# Patient Record
Sex: MALE | Race: OTHER | Employment: UNEMPLOYED | URBAN - METROPOLITAN AREA
[De-identification: names, ages, dates, MRNs, and addresses within clinical notes are randomized per-mention and may not be internally consistent; named-entity substitution may affect disease eponyms.]

---

## 2018-09-13 ENCOUNTER — HOSPITAL ENCOUNTER (OUTPATIENT)
Age: 28
Discharge: HOME | End: 2018-09-13
Payer: COMMERCIAL

## 2018-09-13 LAB
ALBUMIN SERPL-MCNC: 4.4 G/DL (ref 3.5–5.2)
ALBUMIN/GLOBULIN RATIO: ABNORMAL (ref 1–2.5)
ALP BLD-CCNC: 60 U/L (ref 40–129)
ALT SERPL-CCNC: 22 U/L (ref 5–41)
ANION GAP SERPL CALCULATED.3IONS-SCNC: 13 MMOL/L (ref 9–17)
AST SERPL-CCNC: 18 U/L
BILIRUB SERPL-MCNC: 0.21 MG/DL (ref 0.3–1.2)
BILIRUBIN URINE: NEGATIVE
BUN BLDV-MCNC: 9 MG/DL (ref 6–20)
BUN/CREAT BLD: 13 (ref 9–20)
CALCIUM SERPL-MCNC: 9.2 MG/DL (ref 8.6–10.4)
CHLORIDE BLD-SCNC: 106 MMOL/L (ref 98–107)
CHOLESTEROL, FASTING: 139 MG/DL
CHOLESTEROL/HDL RATIO: 6
CO2: 21 MMOL/L (ref 20–31)
COLOR: YELLOW
COMMENT UA: NORMAL
CREAT SERPL-MCNC: 0.67 MG/DL (ref 0.7–1.2)
ESTIMATED AVERAGE GLUCOSE: 117 MG/DL
GFR AFRICAN AMERICAN: >60 ML/MIN
GFR NON-AFRICAN AMERICAN: >60 ML/MIN
GFR SERPL CREATININE-BSD FRML MDRD: ABNORMAL ML/MIN/{1.73_M2}
GFR SERPL CREATININE-BSD FRML MDRD: ABNORMAL ML/MIN/{1.73_M2}
GLUCOSE FASTING: 105 MG/DL (ref 70–99)
GLUCOSE URINE: NEGATIVE
HBA1C MFR BLD: 5.7 % (ref 4–6)
HCT VFR BLD CALC: 43.1 % (ref 41–53)
HDLC SERPL-MCNC: 23 MG/DL
HEMOGLOBIN: 13.9 G/DL (ref 13.5–17.5)
KETONES, URINE: NEGATIVE
LDL CHOLESTEROL: 84 MG/DL (ref 0–130)
LEUKOCYTE ESTERASE, URINE: NEGATIVE
MCH RBC QN AUTO: 26.5 PG (ref 26–34)
MCHC RBC AUTO-ENTMCNC: 32.3 G/DL (ref 31–37)
MCV RBC AUTO: 81.9 FL (ref 80–100)
NITRITE, URINE: NEGATIVE
NRBC AUTOMATED: ABNORMAL PER 100 WBC
PDW BLD-RTO: 15.2 % (ref 11.5–14.5)
PH UA: 7 (ref 5–8)
PLATELET # BLD: 211 K/UL (ref 130–400)
PMV BLD AUTO: 8.7 FL (ref 6–12)
POTASSIUM SERPL-SCNC: 4.1 MMOL/L (ref 3.7–5.3)
PROTEIN UA: NEGATIVE
RBC # BLD: 5.25 M/UL (ref 4.5–5.9)
SODIUM BLD-SCNC: 140 MMOL/L (ref 135–144)
SPECIFIC GRAVITY UA: 1.01 (ref 1–1.03)
TOTAL PROTEIN: 7.8 G/DL (ref 6.4–8.3)
TRIGLYCERIDE, FASTING: 160 MG/DL
TURBIDITY: CLEAR
URINE HGB: NEGATIVE
UROBILINOGEN, URINE: NORMAL
VLDLC SERPL CALC-MCNC: ABNORMAL MG/DL (ref 1–30)
WBC # BLD: 5.7 K/UL (ref 3.5–11)

## 2018-09-13 PROCEDURE — 81003 URINALYSIS AUTO W/O SCOPE: CPT

## 2018-09-13 PROCEDURE — 83036 HEMOGLOBIN GLYCOSYLATED A1C: CPT

## 2018-09-13 PROCEDURE — 36415 COLL VENOUS BLD VENIPUNCTURE: CPT

## 2018-09-13 PROCEDURE — 80061 LIPID PANEL: CPT

## 2018-09-13 PROCEDURE — 85027 COMPLETE CBC AUTOMATED: CPT

## 2018-09-13 PROCEDURE — 80053 COMPREHEN METABOLIC PANEL: CPT

## 2025-02-05 ENCOUNTER — TRANSCRIBE ORDERS (OUTPATIENT)
Dept: ADMINISTRATIVE | Age: 35
End: 2025-02-05

## 2025-02-05 DIAGNOSIS — R94.6 ABNORMAL THYROID SCAN: Primary | ICD-10-CM

## 2025-06-02 ENCOUNTER — HOSPITAL ENCOUNTER (INPATIENT)
Age: 35
LOS: 4 days | Discharge: HOME | End: 2025-06-06
Attending: EMERGENCY MEDICINE | Admitting: INTERNAL MEDICINE
Payer: MEDICARE

## 2025-06-02 DIAGNOSIS — R45.850 HOMICIDAL IDEATIONS: ICD-10-CM

## 2025-06-02 DIAGNOSIS — F32.A DEPRESSION WITH SUICIDAL IDEATION: Primary | ICD-10-CM

## 2025-06-02 DIAGNOSIS — R45.851 DEPRESSION WITH SUICIDAL IDEATION: Primary | ICD-10-CM

## 2025-06-02 PROBLEM — F23 ACUTE PSYCHOSIS (HCC): Status: ACTIVE | Noted: 2025-06-02

## 2025-06-02 LAB
ALBUMIN SERPL-MCNC: 4.4 G/DL (ref 3.5–5.2)
ALP SERPL-CCNC: 60 U/L (ref 40–129)
ALT SERPL-CCNC: 35 U/L (ref 10–50)
AMPHET UR QL SCN: POSITIVE
ANION GAP SERPL CALCULATED.3IONS-SCNC: 13 MMOL/L (ref 9–16)
AST SERPL-CCNC: 44 U/L (ref 10–50)
BARBITURATES UR QL SCN: NEGATIVE
BASOPHILS # BLD: 0.03 K/UL (ref 0–0.2)
BASOPHILS NFR BLD: 0 % (ref 0–2)
BENZODIAZ UR QL: NEGATIVE
BILIRUB SERPL-MCNC: 0.3 MG/DL (ref 0–1.2)
BUN SERPL-MCNC: 8 MG/DL (ref 6–20)
CALCIUM SERPL-MCNC: 9.5 MG/DL (ref 8.6–10.4)
CANNABINOIDS UR QL SCN: NEGATIVE
CHLORIDE SERPL-SCNC: 111 MMOL/L (ref 98–107)
CO2 SERPL-SCNC: 17 MMOL/L (ref 20–31)
COCAINE UR QL SCN: NEGATIVE
CREAT SERPL-MCNC: 0.7 MG/DL (ref 0.7–1.2)
DATE LAST DOSE: NORMAL
EOSINOPHIL # BLD: 0.04 K/UL (ref 0–0.44)
EOSINOPHILS RELATIVE PERCENT: 1 % (ref 0–4)
ERYTHROCYTE [DISTWIDTH] IN BLOOD BY AUTOMATED COUNT: 14.8 % (ref 11.5–14.9)
ETHANOL PERCENT: NORMAL %
ETHANOLAMINE SERPL-MCNC: <10 MG/DL (ref 0–0.08)
FENTANYL UR QL: NEGATIVE
GFR, ESTIMATED: >90 ML/MIN/1.73M2
GLUCOSE SERPL-MCNC: 101 MG/DL (ref 74–99)
HCT VFR BLD AUTO: 39.1 % (ref 41–53)
HGB BLD-MCNC: 12.9 G/DL (ref 13.5–17.5)
IMM GRANULOCYTES # BLD AUTO: 0.04 K/UL (ref 0–0.3)
IMM GRANULOCYTES NFR BLD: 1 %
LYMPHOCYTES NFR BLD: 3.01 K/UL (ref 1.1–3.7)
LYMPHOCYTES RELATIVE PERCENT: 43 % (ref 24–44)
MAGNESIUM SERPL-MCNC: 2.1 MG/DL (ref 1.6–2.6)
MCH RBC QN AUTO: 27.3 PG (ref 26–34)
MCHC RBC AUTO-ENTMCNC: 33 G/DL (ref 31–37)
MCV RBC AUTO: 82.7 FL (ref 80–100)
METHADONE UR QL: NEGATIVE
MONOCYTES NFR BLD: 0.56 K/UL (ref 0.1–1.2)
MONOCYTES NFR BLD: 8 % (ref 3–12)
NEUTROPHILS NFR BLD: 47 % (ref 36–66)
NEUTS SEG NFR BLD: 3.3 K/UL (ref 1.5–8.1)
NRBC BLD-RTO: 0 PER 100 WBC
OPIATES UR QL SCN: NEGATIVE
OXYCODONE UR QL SCN: NEGATIVE
PCP UR QL SCN: NEGATIVE
PLATELET # BLD AUTO: 236 K/UL (ref 150–450)
PMV BLD AUTO: 10.4 FL (ref 8–13.5)
POTASSIUM SERPL-SCNC: 4 MMOL/L (ref 3.7–5.3)
PROT SERPL-MCNC: 8 G/DL (ref 6.6–8.7)
RBC # BLD AUTO: 4.73 M/UL (ref 4.21–5.77)
SODIUM SERPL-SCNC: 141 MMOL/L (ref 136–145)
TEST INFORMATION: ABNORMAL
TME LAST DOSE: NORMAL H
VALPROATE SERPL-MCNC: 88 UG/ML (ref 50–125)
VANCOMYCIN DOSE: NORMAL MG
WBC OTHER # BLD: 7 K/UL (ref 3.5–11)

## 2025-06-02 PROCEDURE — 1240000000 HC EMOTIONAL WELLNESS R&B

## 2025-06-02 PROCEDURE — 83735 ASSAY OF MAGNESIUM: CPT

## 2025-06-02 PROCEDURE — 99285 EMERGENCY DEPT VISIT HI MDM: CPT

## 2025-06-02 PROCEDURE — 36415 COLL VENOUS BLD VENIPUNCTURE: CPT

## 2025-06-02 PROCEDURE — 85025 COMPLETE CBC W/AUTO DIFF WBC: CPT

## 2025-06-02 PROCEDURE — 80307 DRUG TEST PRSMV CHEM ANLYZR: CPT

## 2025-06-02 PROCEDURE — 80164 ASSAY DIPROPYLACETIC ACD TOT: CPT

## 2025-06-02 PROCEDURE — G0480 DRUG TEST DEF 1-7 CLASSES: HCPCS

## 2025-06-02 PROCEDURE — 6370000000 HC RX 637 (ALT 250 FOR IP): Performed by: PSYCHIATRY & NEUROLOGY

## 2025-06-02 PROCEDURE — 80053 COMPREHEN METABOLIC PANEL: CPT

## 2025-06-02 RX ORDER — IBUPROFEN 400 MG/1
400 TABLET, FILM COATED ORAL EVERY 6 HOURS PRN
Status: DISCONTINUED | OUTPATIENT
Start: 2025-06-02 | End: 2025-06-06 | Stop reason: HOSPADM

## 2025-06-02 RX ORDER — DIPHENHYDRAMINE HYDROCHLORIDE 50 MG/ML
50 INJECTION, SOLUTION INTRAMUSCULAR; INTRAVENOUS EVERY 6 HOURS PRN
Status: DISCONTINUED | OUTPATIENT
Start: 2025-06-02 | End: 2025-06-06 | Stop reason: HOSPADM

## 2025-06-02 RX ORDER — MAGNESIUM HYDROXIDE/ALUMINUM HYDROXICE/SIMETHICONE 120; 1200; 1200 MG/30ML; MG/30ML; MG/30ML
30 SUSPENSION ORAL EVERY 6 HOURS PRN
Status: DISCONTINUED | OUTPATIENT
Start: 2025-06-02 | End: 2025-06-06 | Stop reason: HOSPADM

## 2025-06-02 RX ORDER — POLYETHYLENE GLYCOL 3350 17 G/17G
17 POWDER, FOR SOLUTION ORAL DAILY PRN
Status: DISCONTINUED | OUTPATIENT
Start: 2025-06-02 | End: 2025-06-06 | Stop reason: HOSPADM

## 2025-06-02 RX ORDER — LORAZEPAM 1 MG/1
2 TABLET ORAL EVERY 6 HOURS PRN
Status: DISCONTINUED | OUTPATIENT
Start: 2025-06-02 | End: 2025-06-06 | Stop reason: HOSPADM

## 2025-06-02 RX ORDER — POLYETHYLENE GLYCOL 3350 17 G
2 POWDER IN PACKET (EA) ORAL
Status: DISCONTINUED | OUTPATIENT
Start: 2025-06-02 | End: 2025-06-06 | Stop reason: HOSPADM

## 2025-06-02 RX ORDER — LORAZEPAM 2 MG/ML
2 INJECTION INTRAMUSCULAR EVERY 6 HOURS PRN
Status: DISCONTINUED | OUTPATIENT
Start: 2025-06-02 | End: 2025-06-06 | Stop reason: HOSPADM

## 2025-06-02 RX ORDER — TRAZODONE HYDROCHLORIDE 50 MG/1
50 TABLET ORAL NIGHTLY PRN
Status: DISCONTINUED | OUTPATIENT
Start: 2025-06-02 | End: 2025-06-06 | Stop reason: HOSPADM

## 2025-06-02 RX ORDER — ACETAMINOPHEN 325 MG/1
650 TABLET ORAL EVERY 6 HOURS PRN
Status: DISCONTINUED | OUTPATIENT
Start: 2025-06-02 | End: 2025-06-06 | Stop reason: HOSPADM

## 2025-06-02 RX ORDER — HALOPERIDOL 5 MG/ML
5 INJECTION INTRAMUSCULAR EVERY 6 HOURS PRN
Status: DISCONTINUED | OUTPATIENT
Start: 2025-06-02 | End: 2025-06-06 | Stop reason: HOSPADM

## 2025-06-02 RX ORDER — HALOPERIDOL 5 MG/1
5 TABLET ORAL EVERY 6 HOURS PRN
Status: DISCONTINUED | OUTPATIENT
Start: 2025-06-02 | End: 2025-06-06 | Stop reason: HOSPADM

## 2025-06-02 RX ORDER — HYDROXYZINE HYDROCHLORIDE 50 MG/1
50 TABLET, FILM COATED ORAL 3 TIMES DAILY PRN
Status: DISCONTINUED | OUTPATIENT
Start: 2025-06-02 | End: 2025-06-06 | Stop reason: HOSPADM

## 2025-06-02 RX ADMIN — HYDROXYZINE HYDROCHLORIDE 50 MG: 50 TABLET, FILM COATED ORAL at 21:27

## 2025-06-02 RX ADMIN — TRAZODONE HYDROCHLORIDE 50 MG: 50 TABLET ORAL at 21:27

## 2025-06-02 ASSESSMENT — SLEEP AND FATIGUE QUESTIONNAIRES
DO YOU HAVE DIFFICULTY SLEEPING: NO
DO YOU USE A SLEEP AID: NO
AVERAGE NUMBER OF SLEEP HOURS: 6

## 2025-06-02 ASSESSMENT — LIFESTYLE VARIABLES
HOW OFTEN DO YOU HAVE A DRINK CONTAINING ALCOHOL: NEVER
HOW OFTEN DO YOU HAVE A DRINK CONTAINING ALCOHOL: NEVER
HOW MANY STANDARD DRINKS CONTAINING ALCOHOL DO YOU HAVE ON A TYPICAL DAY: PATIENT DOES NOT DRINK
HOW MANY STANDARD DRINKS CONTAINING ALCOHOL DO YOU HAVE ON A TYPICAL DAY: PATIENT DOES NOT DRINK

## 2025-06-02 ASSESSMENT — PATIENT HEALTH QUESTIONNAIRE - PHQ9
SUM OF ALL RESPONSES TO PHQ QUESTIONS 1-9: 2
2. FEELING DOWN, DEPRESSED OR HOPELESS: SEVERAL DAYS
1. LITTLE INTEREST OR PLEASURE IN DOING THINGS: SEVERAL DAYS
SUM OF ALL RESPONSES TO PHQ QUESTIONS 1-9: 2

## 2025-06-02 NOTE — ED NOTES
Provisional Diagnosis:   Acute psychosis     Psychosocial and Contextual Factors:   Patient  presents at the ED via his care worker due to suicidal ideations.     C-SSRS Summary:  X    Patient: X  Family:  X  Agency: X    Substance Abuse: Patient denies    Present Suicidal Behavior:  Patient denying current thoughts    Verbal:      Attempt:     Past Suicidal Behavior: X    Verbal:X    Attempt:       Self-Injurious/Self-Mutilation:        Violence Current or Past        Trauma Identified:       Protective Factors:    Hosing, linked with mental health agency, good support      Risk Factors:           Clinical Summary:    Elías Cha is a 34 y.o. male who presents at the ED via his care worker due to suicidal ideations.     Per care worker, patient has been having an psychotic episode for 10+ days. Patient has had increase aggression, suicidal and homicidal ideations. Care worker reports that patient attempted to run out into traffic and jump out of moving car in order to end his life. Patient confirms that he did try these things in order to end his life.     Patient reports that his girlfriend has been cheating on him and it is causing him to be stressed. Patient stated that when his girlfriend says/does these things it causes him to feel suicidal. Patient reports his girlfriend also threatens suicide. Patient has also admitted to punching walls out of anger and to hurt himself.     Patient reports being linked with [facility]. Patient reports he takes his medication as prescribed.     Patient is his own guardian. Patient has a developmental disability.     Patient lives in group home with 24/7 staff.     Level of Care Disposition:    Writer consulted with Dr. Carey, psychiatrist who recommends inpatient psychiatric admission for safety and stabilization. Patient admitted via pink slip.

## 2025-06-02 NOTE — ED NOTES
Patient was brought to the ED for suicidal ideations. Per patient's Dr. Bill \"the patient jumped out of a moving car, hit the wall trying to break his hands. The patient has been acting this way for 10 plus days because his girlfriend is cheating on him. That the patient is trying to hurt himself\". The patient is linked with [facility] and taking his medications. Patient denies any VH, AH. Patient is having HI to his ex-girlfriend.

## 2025-06-02 NOTE — ED PROVIDER NOTES
EMERGENCY DEPARTMENT ENCOUNTER    Pt Name: Elías Cha  MRN: [id number]  Birthdate 1990  Date of evaluation: 6/2/25  CHIEF COMPLAINT       Chief Complaint   Patient presents with    Suicidal     HISTORY OF PRESENT ILLNESS   HPI  Patient presents with suicidal ideations.  He was walking into traffic.  He was having homicidal ideations towards his girlfriend that he has been having relationship issues with.  She lives right down the street.  They both live in group homes.  He is compliant with medications.  He has a history of seizures.  He lives in a group home.  [facility]  His psychiatrist is Dr. Mcintosh.  Phone number [phone].  I spoke with the patient and his mother with his permission for history.  Patient is reluctant to stay but he agrees to stay in the hospital.  He has been having seizures but he compliant with his medications.  His seizure doctor is at [facility].      REVIEW OF SYSTEMS     Review of Systems   All other systems reviewed and are negative.    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    SURGICAL HISTORY       Past Surgical History:   Procedure Laterality Date    DENTAL SURGERY  01/18/2017    extraction of tooth number 31    HAND SURGERY      Rt finger severed and placed back     HERNIA REPAIR Right OCT 1994    inguinal    UNDESCENDED TESTICLE EXPLORATION  1994    WISDOM TOOTH EXTRACTION  01/18/2017    x4     CURRENT MEDICATIONS

## 2025-06-03 PROBLEM — F33.2 MAJOR DEPRESSIVE DISORDER, RECURRENT SEVERE WITHOUT PSYCHOTIC FEATURES (HCC): Status: ACTIVE | Noted: 2025-06-03

## 2025-06-03 PROBLEM — G40.909 SEIZURE DISORDER (HCC): Status: ACTIVE | Noted: 2025-06-03

## 2025-06-03 PROBLEM — R45.851 DEPRESSION WITH SUICIDAL IDEATION: Status: ACTIVE | Noted: 2025-06-03

## 2025-06-03 PROBLEM — F23 ACUTE PSYCHOSIS (HCC): Status: RESOLVED | Noted: 2025-06-02 | Resolved: 2025-06-03

## 2025-06-03 PROBLEM — F32.A DEPRESSION WITH SUICIDAL IDEATION: Status: ACTIVE | Noted: 2025-06-03

## 2025-06-03 PROBLEM — F84.0 AUTISM SPECTRUM DISORDER: Status: ACTIVE | Noted: 2025-06-03

## 2025-06-03 PROCEDURE — 6370000000 HC RX 637 (ALT 250 FOR IP): Performed by: PSYCHIATRY & NEUROLOGY

## 2025-06-03 PROCEDURE — 6370000000 HC RX 637 (ALT 250 FOR IP): Performed by: INTERNAL MEDICINE

## 2025-06-03 PROCEDURE — 99223 1ST HOSP IP/OBS HIGH 75: CPT | Performed by: PSYCHIATRY & NEUROLOGY

## 2025-06-03 PROCEDURE — 1240000000 HC EMOTIONAL WELLNESS R&B

## 2025-06-03 PROCEDURE — APPSS60 APP SPLIT SHARED TIME 46-60 MINUTES

## 2025-06-03 PROCEDURE — 99222 1ST HOSP IP/OBS MODERATE 55: CPT | Performed by: INTERNAL MEDICINE

## 2025-06-03 RX ORDER — CETIRIZINE HYDROCHLORIDE 10 MG/1
10 TABLET ORAL DAILY
Status: DISCONTINUED | OUTPATIENT
Start: 2025-06-03 | End: 2025-06-06 | Stop reason: HOSPADM

## 2025-06-03 RX ORDER — DIVALPROEX SODIUM 500 MG/1
500 TABLET, FILM COATED, EXTENDED RELEASE ORAL NIGHTLY
Status: DISCONTINUED | OUTPATIENT
Start: 2025-06-03 | End: 2025-06-04

## 2025-06-03 RX ORDER — MIRTAZAPINE 15 MG/1
15 TABLET, FILM COATED ORAL NIGHTLY
Status: DISCONTINUED | OUTPATIENT
Start: 2025-06-03 | End: 2025-06-06 | Stop reason: HOSPADM

## 2025-06-03 RX ORDER — LISDEXAMFETAMINE DIMESYLATE 40 MG/1
40 CAPSULE ORAL DAILY
COMMUNITY

## 2025-06-03 RX ORDER — CLONIDINE HYDROCHLORIDE 0.1 MG/1
0.1 TABLET ORAL 2 TIMES DAILY
Status: DISCONTINUED | OUTPATIENT
Start: 2025-06-03 | End: 2025-06-06 | Stop reason: HOSPADM

## 2025-06-03 RX ORDER — OMEPRAZOLE 20 MG/1
20 CAPSULE, DELAYED RELEASE ORAL DAILY
COMMUNITY

## 2025-06-03 RX ORDER — LORATADINE 10 MG/1
10 TABLET ORAL DAILY
COMMUNITY

## 2025-06-03 RX ORDER — FENOFIBRATE 48 MG/1
48 TABLET, FILM COATED ORAL DAILY
COMMUNITY

## 2025-06-03 RX ORDER — OLANZAPINE 10 MG/1
10 TABLET, FILM COATED ORAL DAILY
Status: DISCONTINUED | OUTPATIENT
Start: 2025-06-03 | End: 2025-06-06 | Stop reason: HOSPADM

## 2025-06-03 RX ORDER — OLANZAPINE 10 MG/1
20 TABLET, FILM COATED ORAL NIGHTLY
Status: DISCONTINUED | OUTPATIENT
Start: 2025-06-03 | End: 2025-06-06 | Stop reason: HOSPADM

## 2025-06-03 RX ORDER — ATORVASTATIN CALCIUM 20 MG/1
20 TABLET, FILM COATED ORAL DAILY
Status: DISCONTINUED | OUTPATIENT
Start: 2025-06-03 | End: 2025-06-06 | Stop reason: HOSPADM

## 2025-06-03 RX ORDER — CLONAZEPAM 0.5 MG/1
0.5 TABLET ORAL 2 TIMES DAILY PRN
Status: ON HOLD | COMMUNITY
End: 2025-06-06 | Stop reason: HOSPADM

## 2025-06-03 RX ORDER — DIVALPROEX SODIUM 125 MG/1
1000 CAPSULE, COATED PELLETS ORAL ONCE
Status: COMPLETED | OUTPATIENT
Start: 2025-06-03 | End: 2025-06-03

## 2025-06-03 RX ORDER — DIVALPROEX SODIUM 250 MG/1
250 TABLET, FILM COATED, EXTENDED RELEASE ORAL DAILY
Status: DISCONTINUED | OUTPATIENT
Start: 2025-06-04 | End: 2025-06-04

## 2025-06-03 RX ORDER — TOPIRAMATE 100 MG/1
200 TABLET, FILM COATED ORAL 2 TIMES DAILY
Status: DISCONTINUED | OUTPATIENT
Start: 2025-06-03 | End: 2025-06-06 | Stop reason: HOSPADM

## 2025-06-03 RX ORDER — LISDEXAMFETAMINE DIMESYLATE 20 MG/1
40 CAPSULE ORAL DAILY
Refills: 0 | Status: DISCONTINUED | OUTPATIENT
Start: 2025-06-03 | End: 2025-06-06 | Stop reason: HOSPADM

## 2025-06-03 RX ORDER — SIMVASTATIN 40 MG
40 TABLET ORAL NIGHTLY
COMMUNITY

## 2025-06-03 RX ORDER — PANTOPRAZOLE SODIUM 40 MG/1
40 TABLET, DELAYED RELEASE ORAL
Status: DISCONTINUED | OUTPATIENT
Start: 2025-06-04 | End: 2025-06-06 | Stop reason: HOSPADM

## 2025-06-03 RX ORDER — FENOFIBRATE 54 MG/1
54 TABLET ORAL DAILY
Status: DISCONTINUED | OUTPATIENT
Start: 2025-06-03 | End: 2025-06-06 | Stop reason: HOSPADM

## 2025-06-03 RX ADMIN — CETIRIZINE HYDROCHLORIDE 10 MG: 10 TABLET, FILM COATED ORAL at 12:24

## 2025-06-03 RX ADMIN — CLONIDINE HYDROCHLORIDE 0.1 MG: 0.1 TABLET ORAL at 12:24

## 2025-06-03 RX ADMIN — LISDEXAMFETAMINE DIMESYLATE 40 MG: 20 CAPSULE ORAL at 14:50

## 2025-06-03 RX ADMIN — ATORVASTATIN CALCIUM 20 MG: 20 TABLET, FILM COATED ORAL at 12:24

## 2025-06-03 RX ADMIN — CLONIDINE HYDROCHLORIDE 0.1 MG: 0.1 TABLET ORAL at 20:48

## 2025-06-03 RX ADMIN — DIVALPROEX SODIUM 1000 MG: 125 CAPSULE ORAL at 08:57

## 2025-06-03 RX ADMIN — DIVALPROEX SODIUM 500 MG: 500 TABLET, EXTENDED RELEASE ORAL at 20:47

## 2025-06-03 RX ADMIN — OLANZAPINE 10 MG: 10 TABLET, FILM COATED ORAL at 12:24

## 2025-06-03 RX ADMIN — FENOFIBRATE 54 MG: 54 TABLET ORAL at 12:29

## 2025-06-03 RX ADMIN — METFORMIN HYDROCHLORIDE 500 MG: 500 TABLET ORAL at 12:24

## 2025-06-03 RX ADMIN — TRAZODONE HYDROCHLORIDE 50 MG: 50 TABLET ORAL at 20:47

## 2025-06-03 RX ADMIN — HYDROXYZINE HYDROCHLORIDE 50 MG: 50 TABLET, FILM COATED ORAL at 20:47

## 2025-06-03 RX ADMIN — MIRTAZAPINE 15 MG: 15 TABLET, FILM COATED ORAL at 20:47

## 2025-06-03 RX ADMIN — TOPIRAMATE 200 MG: 100 TABLET, FILM COATED ORAL at 08:57

## 2025-06-03 RX ADMIN — TOPIRAMATE 200 MG: 100 TABLET, FILM COATED ORAL at 20:47

## 2025-06-03 RX ADMIN — OLANZAPINE 20 MG: 10 TABLET, FILM COATED ORAL at 20:48

## 2025-06-03 RX ADMIN — METFORMIN HYDROCHLORIDE 500 MG: 500 TABLET ORAL at 20:47

## 2025-06-03 ASSESSMENT — LIFESTYLE VARIABLES
HOW OFTEN DO YOU HAVE A DRINK CONTAINING ALCOHOL: NEVER
HOW MANY STANDARD DRINKS CONTAINING ALCOHOL DO YOU HAVE ON A TYPICAL DAY: PATIENT DOES NOT DRINK

## 2025-06-03 NOTE — PLAN OF CARE
Problem: Self Harm/Suicidality  Goal: Will have no self-injury during hospital stay  Description: INTERVENTIONS:1.  Ensure constant observer at bedside with Q15M safety checks2.  Maintain a safe environment3.  Secure patient belongings4.  Ensure family/visitors adhere to safety recommendations5.  Ensure safety tray has been added to patient's diet order6.  Every shift and PRN: Re-assess suicidal risk via Frequent Screener  Outcome: Progressing     Problem: Depression  Goal: Will be euthymic at discharge  Description: INTERVENTIONS:1. Administer medication as ordered2. Provide emotional support via 1:1 interaction with staff3. Encourage involvement in milieu/groups/activities4. Monitor for social isolation  Outcome: Progressing   Patient currently denies thoughts of self harm or suicidal ideation. Patient denies any hallucinations or delusions. Patient stated he slept well through out the night . Patient Is eating adequate amounts of his meals.  Patient has not attended groups stated he plans to attend some groups. Patient mood is cooperative and pleasant. Safety checks continue every 15 minutes. Patient has remained safe. Will continue to support and monitor.

## 2025-06-03 NOTE — PROGRESS NOTES
RT ASSESSMENT TREATMENT GOALS    [x]Pt Goal:  Pt will identify 1-2 positive coping skills by time of discharge.    []Pt Goal:  Pt will identify 1-2 positive aspects of self by time of discharge.    [x]Pt Goal:  Pt will remain on task/topic for 15-30 minutes during group by time of discharge.    []Pt Goal:  Pt will identify 1-2 aspects of relapse prevention plan by time of discharge.    []Pt Goal:  Pt will join in conversation with peers 1-2 times per group by time of discharge.    []Pt Goal:  Pt will identify 1-2 new leisure interests by time of discharge.    [x]Pt Goal: Pt will maintain behavorial control until the time of discharge.

## 2025-06-03 NOTE — H&P
[facility] Internal Medicine  Randy Lancaster MD; Hiro Anand MD,, Latasha Beatty MD, Dr Thiago Alcala MD   ; Pal Hargrove MD    [facility] Internal Medicine   IN-PATIENT SERVICE   [facility]     HISTORY AND PHYSICAL EXAMINATION            Date:   6/3/2025  Patient name:  Elías Cha  Date of admission:  6/2/2025  4:34 PM  MRN:   [id number]  Account:  [id number]  YOB: 1990  PCP:    Maikol Abarca MD  Room:   [address] [place] [address]  Code Status:    Full Code      Chief Complaint:     Suicidal /Ac Psychosis    History Obtained From:     Patient/EMR/bedside RN     History of Present Illness:     Patient is 34-year-old male past medical history of autism spectrum disorder, seizure disorder, latent TB, hypertension, hyperlipidemia morbid obesity BMI of 77, has been admitted in U for further management acute psychosis, next    Past Medical History:     Past Medical History:   Diagnosis Date    Autism     Bipolar 2 disorder (HCC)     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     Exposure to TB 09/2016    had chest x ray positive for latent tb, not a carrier, no active tb    Fractured tooth     BOTTOM RIGHT SIDE OF MOUTH    Hypertension     Neurocardiogenic syncope     Seizures (HCC)     Dr Call    TB lung, latent     treated with INH by PCP, ID contacted and no isolation precautions necessary as it is latent (1/4/17)    Undescended testes 10/1994        Past Surgical History:     Past Surgical History:   Procedure Laterality Date    DENTAL SURGERY  01/18/2017    extraction of tooth number 31    HAND SURGERY      Rt finger severed and placed back     HERNIA REPAIR Right OCT 1994    inguinal    UNDESCENDED TESTICLE EXPLORATION  1994    WISDOM TOOTH EXTRACTION  01/18/2017    x4        Medications Prior to Admission:     Prior to Admission medications    Medication Sig Start Date End Date Taking? Authorizing Provider   Lisdexamfetamine Dimesylate (VYVANSE) 40 MG CAPS Take  transcription may have occurred.

## 2025-06-03 NOTE — PROGRESS NOTES
Pharmacy Medication History Note      List of current medications patient is taking is complete.    Source of information: [facility]    Changes made to medication list:  Medications removed (include reason, ex. therapy complete or physician discontinued, noncompliance):  Dexmethylphenidate ER 25 mg (discontinued)  Nonformulary (list clean up)    Medications flagged for provider review:  N/A    Medications added/doses adjusted:  Divalproex  mg AM + 500 mg PM --> 500 mg AM + 1000 mg PM (dose adjusted)  Fenofibrate 48 mg QD (added)  Simvastatin 40 mg QPM (added)  Clonazepam 0.5 mg BID PRN (added)  Loratadine 10 mg (added)  Vyvanse 40 mg (added)    Other notes (ex. Recent course of antibiotics, Coumadin dosing):  OARRS - 5/27 Clonazepam 0.5 mg 60 qty x 30 days. 5/15 Vyvanse 40 mg 28 qty x 28 days      Current Home Medication List at Time of Admission:  Prior to Admission medications    Medication Sig Start Date End Date Taking? Authorizing Provider   Lisdexamfetamine Dimesylate (VYVANSE) 40 MG CAPS Take 40 mg by mouth daily. Max Daily Amount: 40 mg   Yes Thu Echavarria MD   fenofibrate (TRICOR) 48 MG tablet Take 1 tablet by mouth daily   Yes Thu Echavarria MD   simvastatin (ZOCOR) 40 MG tablet Take 1 tablet by mouth nightly   Yes Thu Echavarria MD   omeprazole (PRILOSEC) 20 MG delayed release capsule Take 1 capsule by mouth daily   Yes Thu Echavarria MD   loratadine (CLARITIN) 10 MG tablet Take 1 tablet by mouth daily   Yes Thu Echavarria MD   clonazePAM (KLONOPIN) 0.5 MG tablet Take 1 tablet by mouth 2 times daily as needed. Max Daily Amount: 1 mg   Yes Thu Echavarria MD   metFORMIN (GLUCOPHAGE) 500 MG tablet Take 1 tablet by mouth in the morning and at bedtime NOT DIABETIC, TAKES TO COUNTERACT ZYPREXA SIDE EFFECTS    Thu Echavarria MD   cloNIDine (CATAPRES) 0.1 MG tablet Take 0.1 mg by mouth 2 times daily    Thu Echavarria MD

## 2025-06-03 NOTE — PROGRESS NOTES
Behavioral Services  Medicare Certification Upon Admission    I certify that this patient's inpatient psychiatric hospital admission is medically necessary for:    [x] (1) Treatment which could reasonably be expected to improve this patient's condition,       [x] (2) Or for diagnostic study;     AND     [x](2) The inpatient psychiatric services are provided while the individual is under the care of a physician and are included in the individualized plan of care.    Estimated length of stay/service 4 to 7 days    Plan for post-hospital care home with outpatient community mental health follow-up    Electronically signed by AHMET HEATON MD on 6/3/2025 at 1:16 PM

## 2025-06-03 NOTE — GROUP NOTE
Psych-Ed/Relapse Prevention Group Note        Date: Dora 3, 2025 Start Time: 11am  End Time: 11:45am      Number of Participants in Group & Unit Census:  4/16    Topic: Coping skills    Goal of Group:Patient will identify healthy coping skills.  Patient will identify ways to avoid and replace negative coping skills with therapeutic options.        Comments:     Patient did not participate in Psych-Ed/Relapse Prevention group, despite staff encouragement and explanation of benefits.  Patient remain seclusive to self.  Q15 minute safety checks maintained for patient safety and will continue to encourage patient to attend unit programming.         Signature:  KOBY RichS

## 2025-06-03 NOTE — BH NOTE
[facility]  Admission Note     Admission Type:   Voluntary     Reason for admission:  Reason for Admission: Patient admitted for acute psychosis. Patient reported suicidal ideation with a plan to jump out of the car. Patient reports homicidal ideation toward his girlfriend for cheating on him. Patient denies any hallucinations and has not been seen responding to internal stimuli.      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     Exposure to TB 09/2016    had chest x ray positive for latent tb, not a carrier, no active tb    Fractured tooth     BOTTOM RIGHT SIDE OF MOUTH    Hypertension     Neurocardiogenic syncope     Seizures (HCC)     Dr Call    TB lung, latent     treated with INH by PCP, ID contacted and no isolation precautions necessary as it is latent (1/4/17)    Undescended testes 10/1994       Status EXAM:  Mental Status and Behavioral Exam  Normal: No  Level of Assistance: Independent/Self  Facial Expression: Flat, Expressionless  Affect: Appropriate  Level of Consciousness: Alert  Frequency of Checks: 4 times per hour, close  Mood:Normal: No  Mood: Depressed, Anxious, Helpless  Motor Activity:Normal: Yes  Eye Contact: Fair  Observed Behavior: Withdrawn, Cooperative, Guarded, Preoccupied  Sexual Misconduct History: Current - no  Preception: [place] to person, [place] to time, [place] to place, [place] to situation  Attention:Normal: No  Attention: Distractible  Thought Processes: Circumstantial  Thought Content:Normal: No  Thought Content: Preoccupations  Depression Symptoms: Feelings of helplessness, Feelings of hopelessess, Feelings of worthlessness, Impaired concentration, Loss of interest  Anxiety Symptoms: Generalized  Marissa Symptoms: No problems

## 2025-06-03 NOTE — PLAN OF CARE
[facility]  Initial Interdisciplinary Treatment Plan NO      Original treatment plan Date & Time: 6/3/25 1245    Admission Type:  Admission Type: Voluntary    Reason for admission:   Reason for Admission: Patient admitted for acute psychosis. Patient reported suicidal ideation with a plan to jump out of the car. Patient reports homicidal ideation toward his girlfriend for cheating on him. Patient denies any hallucinations and has not been seen responding to internal stimuli.    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     Exposure to TB 09/2016    had chest x ray positive for latent tb, not a carrier, no active tb    Fractured tooth     BOTTOM RIGHT SIDE OF MOUTH    Hypertension     Neurocardiogenic syncope     Seizures (HCC)     Dr Call    TB lung, latent     treated with INH by PCP, ID contacted and no isolation precautions necessary as it is latent (1/4/17)    Undescended testes 10/1994     Status EXAM:Mental Status and Behavioral Exam  Normal: No  Level of Assistance: Independent/Self  Facial Expression: Flat  Affect: Appropriate  Level of Consciousness: Alert  Frequency of Checks: 4 times per hour, close  Mood:Normal: No  Mood: Anxious, Depressed  Motor Activity:Normal: Yes  Eye Contact: Good  Observed Behavior: Cooperative, Friendly, Preoccupied  Sexual Misconduct History: Current - no  Preception: [place] to person, [place] to  time, [place] to place  Attention:Normal: No  Attention: Unable to concentrate  Thought Processes: Circumstantial  Thought Content:Normal: No  Thought Content: Preoccupations  Depression Symptoms: Isolative, Feelings of hopelessess  Anxiety Symptoms: Generalized  Marissa Symptoms: No problems reported or observed.  Hallucinations: None  Delusions: No  Memory:Normal: Yes  Insight and Judgment: No  Insight and Judgment: Poor judgment, Poor insight    EDUCATION:   Learner Progress Toward Treatment Goals: reviewed group plans and strategies for care    Method:group therapy, medication compliance, individualized assessments and care planning    Outcome: needs reinforcement    PATIENT GOALS: to be discussed with patient within 72 hours    PLAN/TREATMENT RECOMMENDATIONS:     continue group therapy , medications compliance, goal setting, individualized assessments and care, continue to monitor pt on unit      SHORT-TERM GOALS:   Time frame for Short-Term Goals: 5-7 days    LONG-TERM GOALS:  Time frame for Long-Term Goals: 6 months  Members Present in Team Meeting: See Signature Sheet    Miguel Angel Lantigua RN

## 2025-06-03 NOTE — CARE COORDINATION
BHI Biopsychosocial Assessment    Current Level of Psychosocial Functioning     Independent   Dependent  XX  Minimal Assist     Comments:    Psychosocial High Risk Factors (check all that apply)    Unable to obtain meds   Chronic illness/pain    Substance abuse   Lack of Family Support   Financial stress   Isolation   Inadequate Community Resources   Suicide attempt(s)   Not taking medications   Victim of crime   Developmental Delay XX  Unable to manage personal needs    Age 65 or older   Homeless   No transportation   Readmission within 30 days   Unemployment   Traumatic Event     Comments:   Psychiatric Advanced Directives: n/a    Family to Involve in Treatment: denies    Sexual Orientation: n/a     Patient Strengths: Patient has social support, housing with 24 hr staff, income, insurance, linked to [employer] , linked to [facility]    Patient Barriers: Hx of admissions      Opiate Education Provided: n/a       CMHC/mental health history: Linked to [facility]    Plan of Care   medication management, group/individual therapies, family meetings, psycho -education, treatment team meetings to assist with stabilization    Initial Discharge Plan: Discharge home with 24 hr staff and continue established services with [facility]        Clinical Summary: Patient is a 34 year old male admitted to [facility] for suicidal ideation. Patient has a hx of admissions. Patient's last admission being in 2012. Patient is linked to [place] [facility]. Patient's SSA is Gaye ([phone]). Patient has a home with 24 hr staffing. Patient is linked to [employer]. Patient minimally engaged in social work assessment. Social work to provide support and assist with discharge planning.

## 2025-06-03 NOTE — GROUP NOTE
Psych-Ed/Relapse Prevention Group Note        Date: Dora 3, 2025 Start Time: 1:30pm  End Time:  2:15pm      Number of Participants in Group & Unit Census:  3/15    Topic: Leisure skills    Goal of Group:Patient will identify benefits of leisure for coping and stress management      Comments:     Patient did not participate in Psych-Ed/Relapse Prevention group, despite staff encouragement and explanation of benefits.  Patient remain seclusive to self.  Q15 minute safety checks maintained for patient safety and will continue to encourage patient to attend unit programming.         Signature:  Jessi Beach, CTRS

## 2025-06-04 LAB
ANION GAP SERPL CALCULATED.3IONS-SCNC: 11 MMOL/L (ref 9–16)
BUN SERPL-MCNC: 12 MG/DL (ref 6–20)
CALCIUM SERPL-MCNC: 9.1 MG/DL (ref 8.6–10.4)
CHLORIDE SERPL-SCNC: 113 MMOL/L (ref 98–107)
CHOLEST SERPL-MCNC: 128 MG/DL (ref 0–199)
CHOLESTEROL/HDL RATIO: 5.8
CO2 SERPL-SCNC: 19 MMOL/L (ref 20–31)
CREAT SERPL-MCNC: 0.8 MG/DL (ref 0.7–1.2)
EST. AVERAGE GLUCOSE BLD GHB EST-MCNC: 105 MG/DL
FERRITIN SERPL-MCNC: 51 NG/ML (ref 30–400)
GFR, ESTIMATED: >90 ML/MIN/1.73M2
GLUCOSE SERPL-MCNC: 104 MG/DL (ref 74–99)
HBA1C MFR BLD: 5.3 % (ref 4–6)
HDLC SERPL-MCNC: 22 MG/DL
IRON SATN MFR SERPL: 10 % (ref 20–55)
IRON SERPL-MCNC: 40 UG/DL (ref 61–157)
LDLC SERPL CALC-MCNC: 80 MG/DL (ref 0–100)
POTASSIUM SERPL-SCNC: 4.1 MMOL/L (ref 3.7–5.3)
SODIUM SERPL-SCNC: 143 MMOL/L (ref 136–145)
TIBC SERPL-MCNC: 419 UG/DL (ref 250–450)
TRIGL SERPL-MCNC: 130 MG/DL (ref 0–149)
UNSATURATED IRON BINDING CAPACITY: 379 UG/DL (ref 112–347)

## 2025-06-04 PROCEDURE — 6370000000 HC RX 637 (ALT 250 FOR IP): Performed by: PSYCHIATRY & NEUROLOGY

## 2025-06-04 PROCEDURE — 6370000000 HC RX 637 (ALT 250 FOR IP): Performed by: INTERNAL MEDICINE

## 2025-06-04 PROCEDURE — 82728 ASSAY OF FERRITIN: CPT

## 2025-06-04 PROCEDURE — 83540 ASSAY OF IRON: CPT

## 2025-06-04 PROCEDURE — 83550 IRON BINDING TEST: CPT

## 2025-06-04 PROCEDURE — 99232 SBSQ HOSP IP/OBS MODERATE 35: CPT | Performed by: INTERNAL MEDICINE

## 2025-06-04 PROCEDURE — 80061 LIPID PANEL: CPT

## 2025-06-04 PROCEDURE — 36415 COLL VENOUS BLD VENIPUNCTURE: CPT

## 2025-06-04 PROCEDURE — 90833 PSYTX W PT W E/M 30 MIN: CPT | Performed by: PSYCHIATRY & NEUROLOGY

## 2025-06-04 PROCEDURE — 80048 BASIC METABOLIC PNL TOTAL CA: CPT

## 2025-06-04 PROCEDURE — 1240000000 HC EMOTIONAL WELLNESS R&B

## 2025-06-04 PROCEDURE — 99232 SBSQ HOSP IP/OBS MODERATE 35: CPT | Performed by: PSYCHIATRY & NEUROLOGY

## 2025-06-04 PROCEDURE — APPSS30 APP SPLIT SHARED TIME 16-30 MINUTES

## 2025-06-04 PROCEDURE — 83036 HEMOGLOBIN GLYCOSYLATED A1C: CPT

## 2025-06-04 RX ORDER — FERROUS SULFATE 325(65) MG
325 TABLET ORAL 2 TIMES DAILY WITH MEALS
Status: DISCONTINUED | OUTPATIENT
Start: 2025-06-04 | End: 2025-06-06 | Stop reason: HOSPADM

## 2025-06-04 RX ORDER — DIVALPROEX SODIUM 500 MG/1
500 TABLET, FILM COATED, EXTENDED RELEASE ORAL DAILY
Status: DISCONTINUED | OUTPATIENT
Start: 2025-06-04 | End: 2025-06-06 | Stop reason: HOSPADM

## 2025-06-04 RX ORDER — DIVALPROEX SODIUM 500 MG/1
1000 TABLET, FILM COATED, EXTENDED RELEASE ORAL NIGHTLY
Status: DISCONTINUED | OUTPATIENT
Start: 2025-06-04 | End: 2025-06-06 | Stop reason: HOSPADM

## 2025-06-04 RX ORDER — DIVALPROEX SODIUM 500 MG/1
1000 TABLET, FILM COATED, EXTENDED RELEASE ORAL ONCE
Status: COMPLETED | OUTPATIENT
Start: 2025-06-04 | End: 2025-06-04

## 2025-06-04 RX ADMIN — CETIRIZINE HYDROCHLORIDE 10 MG: 10 TABLET, FILM COATED ORAL at 09:31

## 2025-06-04 RX ADMIN — ATORVASTATIN CALCIUM 20 MG: 20 TABLET, FILM COATED ORAL at 09:31

## 2025-06-04 RX ADMIN — DIVALPROEX SODIUM 1000 MG: 500 TABLET, EXTENDED RELEASE ORAL at 09:31

## 2025-06-04 RX ADMIN — OLANZAPINE 20 MG: 10 TABLET, FILM COATED ORAL at 21:01

## 2025-06-04 RX ADMIN — HYDROXYZINE HYDROCHLORIDE 50 MG: 50 TABLET, FILM COATED ORAL at 21:01

## 2025-06-04 RX ADMIN — METFORMIN HYDROCHLORIDE 500 MG: 500 TABLET ORAL at 09:30

## 2025-06-04 RX ADMIN — DIVALPROEX SODIUM 500 MG: 500 TABLET, EXTENDED RELEASE ORAL at 10:13

## 2025-06-04 RX ADMIN — TRAZODONE HYDROCHLORIDE 50 MG: 50 TABLET ORAL at 21:01

## 2025-06-04 RX ADMIN — TOPIRAMATE 200 MG: 100 TABLET, FILM COATED ORAL at 21:01

## 2025-06-04 RX ADMIN — LISDEXAMFETAMINE DIMESYLATE 40 MG: 20 CAPSULE ORAL at 09:30

## 2025-06-04 RX ADMIN — FERROUS SULFATE TAB 325 MG (65 MG ELEMENTAL FE) 325 MG: 325 (65 FE) TAB at 17:11

## 2025-06-04 RX ADMIN — PANTOPRAZOLE SODIUM 40 MG: 40 TABLET, DELAYED RELEASE ORAL at 09:31

## 2025-06-04 RX ADMIN — CLONIDINE HYDROCHLORIDE 0.1 MG: 0.1 TABLET ORAL at 21:01

## 2025-06-04 RX ADMIN — MIRTAZAPINE 15 MG: 15 TABLET, FILM COATED ORAL at 21:01

## 2025-06-04 RX ADMIN — DIVALPROEX SODIUM 1000 MG: 500 TABLET, EXTENDED RELEASE ORAL at 21:01

## 2025-06-04 RX ADMIN — OLANZAPINE 10 MG: 10 TABLET, FILM COATED ORAL at 09:31

## 2025-06-04 RX ADMIN — TOPIRAMATE 200 MG: 100 TABLET, FILM COATED ORAL at 09:30

## 2025-06-04 RX ADMIN — FENOFIBRATE 54 MG: 54 TABLET ORAL at 09:36

## 2025-06-04 ASSESSMENT — PAIN SCALES - GENERAL: PAINLEVEL_OUTOF10: 0

## 2025-06-04 NOTE — GROUP NOTE
Group Therapy Note    Date: 6/4/2025    Group Start Time: 1000  Group End Time: 1025  Group Topic: Psychoeducation    [facility] StepKatelynn Suazo, LSW        Group Therapy Note    Attendees: 2/17       patient refused to attend psychoeducation group at 10a after encouragement from staff.  1:1 talk time provided as alternative to group session

## 2025-06-04 NOTE — GROUP NOTE
Psych-Ed/Relapse Prevention Group Note        Date: June 4, 2025 Start Time: 11am  End Time: 11:45am      Number of Participants in Group & Unit Census:  7/16    Topic: Coping skills and Relaxation    Goal of Group:Patient will identify benefits of music for coping and stress management.      Comments:     Patient did not participate in Psych-Ed/Relapse Prevention group, despite staff encouragement and explanation of benefits.  Patient remain seclusive to self.  Q15 minute safety checks maintained for patient safety and will continue to encourage patient to attend unit programming.         Signature:  Jessi Beach, CTRS

## 2025-06-04 NOTE — GROUP NOTE
Psych-Ed/Relapse Prevention Group Note        Date: June 4, 2025 Start Time: 2:30pm  End Time: 3pm      Number of Participants in Group & Unit Census:  6/16    Topic: Socialization skills    Goal of Group:Patient will demonstrate improved interpersonal skills      Comments:     Patient did not participate in Psych-Ed/Relapse Prevention group, despite staff encouragement and explanation of benefits.  Patient remain seclusive to self.  Q15 minute safety checks maintained for patient safety and will continue to encourage patient to attend unit programming.         Signature:  KOBY RichS

## 2025-06-04 NOTE — PROGRESS NOTES
[facility] Internal Medicine  Randy Lancaster MD; Hiro Anand MD,, Latasha Beatty MD, Dr Thiago Alcala MD   ; Pal Hargrove MD    [facility] Internal Medicine   IN-PATIENT SERVICE   [facility]     HISTORY AND PHYSICAL EXAMINATION            Date:   6/4/2025  Patient name:  Elías Cha  Date of admission:  6/2/2025  4:34 PM  MRN:   [id number]  Account:  [id number]  YOB: 1990  PCP:    Maikol Abarca MD  Room:   [address] [place] [address]  Code Status:    Full Code      Chief Complaint:     Suicidal /Ac Psychosis    History Obtained From:     Patient/EMR/bedside RN     History of Present Illness:     Patient is 34-year-old male past medical history of autism spectrum disorder, seizure disorder, latent TB, hypertension, hyperlipidemia morbid obesity BMI of 77, has been admitted in U for further management acute psychosis, next    Past Medical History:     Past Medical History:   Diagnosis Date    Autism     Bipolar 2 disorder (HCC)     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     Exposure to TB 09/2016    had chest x ray positive for latent tb, not a carrier, no active tb    Fractured tooth     BOTTOM RIGHT SIDE OF MOUTH    Hypertension     Neurocardiogenic syncope     Seizures (HCC)     Dr Call    TB lung, latent     treated with INH by PCP, ID contacted and no isolation precautions necessary as it is latent (1/4/17)    Undescended testes 10/1994        Past Surgical History:     Past Surgical History:   Procedure Laterality Date    DENTAL SURGERY  01/18/2017    extraction of tooth number 31    EXPLORATION OF UNDESCENDED TESTICLE  1994    HAND SURGERY      Rt finger severed and placed back     HERNIA REPAIR Right OCT 1994    inguinal    WISDOM TOOTH EXTRACTION  01/18/2017    x4        Medications Prior to Admission:     Prior to Admission medications    Medication Sig Start Date End Date Taking? Authorizing Provider   Lisdexamfetamine Dimesylate (VYVANSE) 40 MG CAPS

## 2025-06-04 NOTE — GROUP NOTE
Group Therapy Note    Date: 2025    Group Start Time: 1340  Group End Time: 1410  Group Topic: Nursing    [facility]    [place], Matthew, RICCI        Group Therapy Note    Attendees:          Patient's Goal:  ***    Notes:  ***    Status After Intervention:  {Status After Intervention:071183075}    Participation Level: {Participation Level:235666516}    Participation Quality: {[facility] PARTICIPATION QUALITY:573484306}      Speech:  {ED  CD_SPEECH:43515}      Thought Process/Content: {Thought Process/Content:810924380}      Affective Functioning: {Affective Functionin}      Mood: {Mood:645158104}      Level of consciousness:  {Level of consciousness:971345980}      Response to Learning: {[facility] Responses to Learnin}      Endings: {[facility] Endings:36296}    Modes of Intervention: {[facility] Modes of Intervention:325410269}      Discipline Responsible: {[facility] Multidisciplinary:568852720}      Signature:  Michael Rivers RN

## 2025-06-04 NOTE — PROGRESS NOTES
Daily Progress Note  6/4/2025    Patient Name: Elías Cha    CHIEF COMPLAINT: Severe recurrent major depression without psychotic features         SUBJECTIVE:    Patient is seen today for a follow up assessment.  Patient was found in the milieu and agreed to meet without privacy.  He is cooperative upon approach.  Alert noted x 4.  Noting improvement suicidal thoughts and denies any homicidal thoughts.  Denies any hallucinations, delusions, or paranoia.  States that his sleep and appetite are fine.  He has remained in behavioral control.  He has not attended groups but is encouraged to do so.  Mostly aloof with patients and staff.  Continues to appear paranoid and anxious.  He has not shown any self-harming behaviors.  He is taking his medications as prescribed and denies any concerns.  He is fixated on discharge.  He states he did speak to his mom on the phone and feels better about this.  Patient has had some significant behavioral concerns over the past 10 days causing the risk to himself and others.  We will continue to monitor for stability.  Medication management discharge planning pretending.    Appetite:  [x] Adequate/Unchanged  [] Increased  [] Decreased      Sleep:       [x] Adequate/Unchanged  [] Fair  [] Poor      Group Attendance on Unit:   [] Yes   [] Selectively    [x] No    Compliant with scheduled medications: [x] Yes  [] No    Received emergency medications in past 24 hrs: [] Yes   [x] No    Medication Side Effects: Denies         Mental Status Exam  Level of consciousness: Alert and awake   Appearance: Appropriate attire for setting, standing, with fair  grooming and hygiene   Behavior/Motor: Approachable, engages with interviewer, no psychomotor abnormalities   Attitude toward examiner: Cooperative, attentive, good eye contact  Speech: slow and monotone   Mood: \"Okay\"  Affect: Blunted  Thought processes: linear and slow   Thought content:  Denies homicidal ideation  Suicidal Ideation:

## 2025-06-04 NOTE — BH NOTE
Parents were called to inform them of Nics transfer to different unit but the memory for voice mail was full.

## 2025-06-04 NOTE — GROUP NOTE
Group Therapy Note    Date: 6/3/2025    Group Start Time: 1000  Group End Time: 1030  Group Topic: Psychoeducation    [facility] BHJOHNATHAN StepKatelynn Suazo, LSW        Group Therapy Note    Attendees: 2/16       patient refused to attend psychoeducation group at 10a after encouragement from staff.  1:1 talk time provided as alternative to group session

## 2025-06-04 NOTE — PLAN OF CARE
Problem: Self Harm/Suicidality  Goal: Will have no self-injury during hospital stay  Description: INTERVENTIONS:1.  Ensure constant observer at bedside with Q15M safety checks2.  Maintain a safe environment3.  Secure patient belongings4.  Ensure family/visitors adhere to safety recommendations5.  Ensure safety tray has been added to patient's diet order6.  Every shift and PRN: Re-assess suicidal risk via Frequent Screener  6/4/2025 1037 by So Loving RN  Outcome: Progressing   No thoughts of self harm reported  Problem: Depression  Goal: Will be euthymic at discharge  Description: INTERVENTIONS:1. Administer medication as ordered2. Provide emotional support via 1:1 interaction with staff3. Encourage involvement in milieu/groups/activities4. Monitor for social isolation  6/4/2025 1037 by So Loving, RN  Outcome: Progressing   States he is depressed

## 2025-06-04 NOTE — PLAN OF CARE
Problem: Self Harm/Suicidality  Goal: Will have no self-injury during hospital stay  Description: INTERVENTIONS:1.  Ensure constant observer at bedside with Q15M safety checks2.  Maintain a safe environment3.  Secure patient belongings4.  Ensure family/visitors adhere to safety recommendations5.  Ensure safety tray has been added to patient's diet order6.  Every shift and PRN: Re-assess suicidal risk via Frequent Screener  Outcome: Progressing  Note: Patient denied thoughts of hurting himself or others. He remains free from self harm and injury      Problem: Depression  Goal: Will be euthymic at discharge  Description: INTERVENTIONS:1. Administer medication as ordered2. Provide emotional support via 1:1 interaction with staff3. Encourage involvement in milieu/groups/activities4. Monitor for social isolation  Outcome: Progressing  Note: Patient denied all feelings of anxiety and depression he reported wanting to be discharged. He appeared to be flat and guarded. Patient was aloof of peers in dayroom. He was medication compliant, ate snack and showered
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
## 2025-06-05 PROCEDURE — 6370000000 HC RX 637 (ALT 250 FOR IP): Performed by: PSYCHIATRY & NEUROLOGY

## 2025-06-05 PROCEDURE — 1240000000 HC EMOTIONAL WELLNESS R&B

## 2025-06-05 PROCEDURE — APPSS30 APP SPLIT SHARED TIME 16-30 MINUTES

## 2025-06-05 PROCEDURE — 6370000000 HC RX 637 (ALT 250 FOR IP): Performed by: INTERNAL MEDICINE

## 2025-06-05 PROCEDURE — 90833 PSYTX W PT W E/M 30 MIN: CPT | Performed by: PSYCHIATRY & NEUROLOGY

## 2025-06-05 PROCEDURE — 99232 SBSQ HOSP IP/OBS MODERATE 35: CPT | Performed by: PSYCHIATRY & NEUROLOGY

## 2025-06-05 RX ADMIN — CETIRIZINE HYDROCHLORIDE 10 MG: 10 TABLET, FILM COATED ORAL at 09:32

## 2025-06-05 RX ADMIN — PANTOPRAZOLE SODIUM 40 MG: 40 TABLET, DELAYED RELEASE ORAL at 09:36

## 2025-06-05 RX ADMIN — TRAZODONE HYDROCHLORIDE 50 MG: 50 TABLET ORAL at 21:33

## 2025-06-05 RX ADMIN — DIVALPROEX SODIUM 1000 MG: 500 TABLET, EXTENDED RELEASE ORAL at 21:33

## 2025-06-05 RX ADMIN — ATORVASTATIN CALCIUM 20 MG: 20 TABLET, FILM COATED ORAL at 09:32

## 2025-06-05 RX ADMIN — FENOFIBRATE 54 MG: 54 TABLET ORAL at 16:41

## 2025-06-05 RX ADMIN — TOPIRAMATE 200 MG: 100 TABLET, FILM COATED ORAL at 21:33

## 2025-06-05 RX ADMIN — METFORMIN HYDROCHLORIDE 500 MG: 500 TABLET ORAL at 21:33

## 2025-06-05 RX ADMIN — CLONIDINE HYDROCHLORIDE 0.1 MG: 0.1 TABLET ORAL at 21:33

## 2025-06-05 RX ADMIN — TOPIRAMATE 200 MG: 100 TABLET, FILM COATED ORAL at 09:31

## 2025-06-05 RX ADMIN — MIRTAZAPINE 15 MG: 15 TABLET, FILM COATED ORAL at 21:33

## 2025-06-05 RX ADMIN — FERROUS SULFATE TAB 325 MG (65 MG ELEMENTAL FE) 325 MG: 325 (65 FE) TAB at 09:31

## 2025-06-05 RX ADMIN — METFORMIN HYDROCHLORIDE 500 MG: 500 TABLET ORAL at 09:32

## 2025-06-05 RX ADMIN — OLANZAPINE 20 MG: 10 TABLET, FILM COATED ORAL at 21:33

## 2025-06-05 RX ADMIN — DIVALPROEX SODIUM 500 MG: 500 TABLET, EXTENDED RELEASE ORAL at 09:31

## 2025-06-05 RX ADMIN — FERROUS SULFATE TAB 325 MG (65 MG ELEMENTAL FE) 325 MG: 325 (65 FE) TAB at 16:41

## 2025-06-05 RX ADMIN — LISDEXAMFETAMINE DIMESYLATE 40 MG: 20 CAPSULE ORAL at 09:40

## 2025-06-05 RX ADMIN — OLANZAPINE 10 MG: 10 TABLET, FILM COATED ORAL at 09:32

## 2025-06-05 NOTE — CARE COORDINATION
Writer spoke with Gaye from the [facility] [employer]al [employer]abilities to advise of Luis Carlos' discharge tomorrow 06/06/2025. Gaye states [facility] is his 24 hour provider, states that Juventino is the in home care manager [phone] and he should be contacted about discharge coordination. She states care staff will transport.     Writer left voice mail for Juventino to discuss discharge process.

## 2025-06-05 NOTE — GROUP NOTE
Group Therapy Note    Date: 6/5/2025    Group Start Time: 1000  Group End Time: 1020  Group Topic: Psychoeducation    [facility]    Miguel Angel Umana        Group Therapy Note    Attendees: 1/13     Patient was offered group therapy today but declined to participate despite encouragement from staff. 1:1 was offered.    Signature:  Miguel Angel Umana

## 2025-06-05 NOTE — GROUP NOTE
Group Therapy Note    Date: 6/5/2025    Group Start Time: 1435  Group End Time: 1535  Group Topic: Activity    [facility]    Maryan Sanchez CTRS        Group Therapy Note    Attendees: 7/11     Patient's Goal: To increase socialization, explore positive coping through creative leisure activities including art and music, journaling ideas, and communication skills.          Notes: Pt was initially loud and somewhat hyperactive but as group progressed pt was calm , appropriately social with peers and able to sit still.     Pt started group with some childlike and attention seeking behaviors but when music started, pt stared to sing, danced briefly then sat down and talked with a peer for most of group. Pt was pleasant and cooperative and participated in social conversation. Pt showed interest in peers' comments and music choices, as well as their art work.       Status After Intervention:  Improved       Participation Level:  Attentive, sharing , supportive     Participation Quality: Attentive, sharing , supportive          Speech:  Initially loud and childlike but became more calm and talked appropriately with a male peer who was supportive.     Thought Process/Content: Tangential, child like but improved with music and calm and supportive interaction with peer.    Affective Functioning: Congruent, brightened      Mood: Initially hyperactive, became calm with music and was cooperative and social with peers in group. Euthymic      Level of consciousness:  Attentive and Alert      Response to Learning: Able to verbalize/acknowledge limited current knowledge, Attentive to some    new learning with more concrete explanations,and Progressing to goal      Endings: None Reported     Modes of Intervention: Education, Support, Exploration, Clarifying, and Problem solving      Discipline Responsible: Psychoeducational Specialist  Signature:  MARYAN SANCHEZ  CTRS

## 2025-06-05 NOTE — PROGRESS NOTES
06/05/25 1400   Encounter Summary   Encounter Overview/Reason Behavioral Health   Service Provided For Patient   Last Encounter  06/05/25   Behavioral Health    Type  [facility]   Assessment/Intervention/Outcome   Assessment Anxious;Impaired social interaction   Intervention Active listening;Confronted/Challenged;Prayer (assurance of)/[place];Sustaining Presence/Ministry of presence   Outcome Receptive

## 2025-06-05 NOTE — PLAN OF CARE
Problem: Depression  Goal: Will be euthymic at discharge  Description: INTERVENTIONS:1. Administer medication as ordered2. Provide emotional support via 1:1 interaction with staff3. Encourage involvement in milieu/groups/activities4. Monitor for social isolation  6/5/2025 0037 by Sabiha Herring  Outcome: Progressing  Note: Out in the milieu, aloof of peers. Anxious but cooperative. Flat affect. Compliant with all prescribed medications for this shift except Metformin states \"I don't take that anymore.\" Denies suicidal thoughts at this time. Safety checks maintained q15min and irregular rounding.      Problem: Self Harm/Suicidality  Goal: Will have no self-injury during hospital stay  Description: INTERVENTIONS:1.  Ensure constant observer at bedside with Q15M safety checks2.  Maintain a safe environment3.  Secure patient belongings4.  Ensure family/visitors adhere to safety recommendations5.  Ensure safety tray has been added to patient's diet order6.  Every shift and PRN: Re-assess suicidal risk via Frequent Screener  6/5/2025 0037 by Sabiha Herring  Outcome: Progressing  Note: Remains free from self harm at this time. Pt denies thoughts of self harm and is agreeable to seeking out should thoughts of self harm arise.  Safe environment maintained.  Q15 minute checks for safety cont per unit policy.  Will cont to monitor for safety and provides support and reassurance as needed.

## 2025-06-05 NOTE — PROGRESS NOTES
Daily Progress Note  6/5/2025    Patient Name: Elías Cha    CHIEF COMPLAINT: Severe recurrent major depression without psychotic features         SUBJECTIVE:    Patient is seen today for a follow up assessment.  Patient was found the milieu and agreed to meet without privacy.  He is pleasant and cooperative.  Social with peers and staff.  He has been attending groups.  He remains in behavioral control.  Notes an improvement in suicidal thoughts and denies any homicidal thoughts.  Denies any paranoia, delusions, hallucinations.  States he is feeling ready to go home.  When asked about how he would feel returning back around his ex-girlfriend he states \"I will be okay.\"  He has good support from mother and caregivers.  He is taking his meds as prescribed denies any side effects.  He has not exhibited any self-harming behaviors.  He denies any problems with sleep or appetite.  Medication management and discharge planning per attending.      Appetite:  [x] Adequate/Unchanged  [] Increased  [] Decreased      Sleep:       [x] Adequate/Unchanged  [] Fair  [] Poor      Group Attendance on Unit:   [x] Yes   [] Selectively    [] No    Compliant with scheduled medications: [x] Yes  [] No    Received emergency medications in past 24 hrs: [] Yes   [x] No    Medication Side Effects: Denies         Mental Status Exam  Level of consciousness: Alert and awake   Appearance: Appropriate attire for setting, seated in chair, with good  grooming and hygiene   Behavior/Motor: Approachable, engages with interviewer, no psychomotor abnormalities   Attitude toward examiner: Cooperative, attentive, good eye contact  Speech: spontaneous, normal rate, and normal volume   Mood: \"Good\"  Affect: Blunted  Thought processes: linear, goal directed, and coherent   Thought content:  Denies homicidal ideation  Suicidal Ideation: Reports improvement in suicidal ideations, contracts for safety on the unit.   Delusions: No evidence of delusions.  voice recognition software. It may contain minor errors which are inherent in voice recognition technology.**     I independently saw and evaluated the patient.  I reviewed the nurse practitioners documentation above.  Principle diagnosis we are treating for is Severe recurrent major depression without psychotic features (HCC).  Any additional comments or changes to the nurse practitioners documentation are stated below otherwise agree with assessment.  Plan will be as follows:  Spent 17 minutes with the patient in supportive psychotherapy.  Patient denying side effects to medication.  Reporting improvement in mood.  Denying suicidal or homicidal ideation intent or plan.  Denying psychotic symptoms.  Forward-looking and constructive.  Discussed if continued stability or improvement through tomorrow would consider discharge and patient is in agreement  Documentation for dos 6-5-25  PLAN  Patient s symptoms   are improving  Continue with current medication for now  Attempt to develop insight  Psycho-education conducted.  Supportive Therapy conducted.  Probable discharge is tomorrow  Follow-up daily while on inpatient unit    Electronically signed by AHMET HEATON MD on 6/6/2025 at 7:00 AM

## 2025-06-05 NOTE — CARE COORDINATION
Writer spoke with Elías' mom to advise of discharge tomorrow. She confirms Elías lives at [address] [place] [address] which is the supportive living environment, writer advised writer has left message with Sandor, in home coordinator to arrange details for transport tomorrow

## 2025-06-05 NOTE — BH NOTE
[facility]  Day [facility] Interdisciplinary Treatment Plan NOTE    Review Date & Time: 6/5/25 9142    Admission Type:   Admission Type: Voluntary    Reason for admission:  Reason for Admission: Patient admitted for acute psychosis. Patient reported suicidal ideation with a plan to jump out of the car. Patient reports homicidal ideation toward his girlfriend for cheating on him. Patient denies any hallucinations and has not been seen responding to internal stimuli.  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     Exposure to TB 09/2016    had chest x ray positive for latent tb, not a carrier, no active tb    Fractured tooth     BOTTOM RIGHT SIDE OF MOUTH    Hypertension     Neurocardiogenic syncope     Seizures (HCC)     Dr Call    TB lung, latent     treated with INH by PCP, ID contacted and no isolation precautions necessary as it is latent (1/4/17)    Undescended testes 10/1994       Risk:  Fall Risk   Denver Scale Denver Scale Score: 22  BVC    Change in scores no Changes to plan of Care no    Status EXAM:   Mental Status and Behavioral Exam  Normal: No  Level of Assistance: Independent/Self  Facial Expression: Flat, Worried  Affect: Appropriate  Level of Consciousness: Alert  Frequency of Checks: 4 times per hour, close  Mood:Normal: No  Mood: Depressed, Anxious  Motor Activity:Normal: Yes  Eye Contact: Good  Observed Behavior:  planning  Long term:Maintain all follow up appointments     PLAN/TREATMENT RECOMMENDATIONS UPDATE: continue with group therapies, increased socialization, continue planning for after discharge goals, continue with medication compliance    SHORT-TERM GOALS UPDATE:   Time frame for Short-Term Goals: 5-7 days    LONG-TERM GOALS UPDATE:   Time frame for Long-Term Goals: 6 months  Members Present in Team Meeting: See Signature Sheet    Swati Russell RN

## 2025-06-06 VITALS
DIASTOLIC BLOOD PRESSURE: 68 MMHG | OXYGEN SATURATION: 100 % | SYSTOLIC BLOOD PRESSURE: 105 MMHG | WEIGHT: 240 LBS | HEIGHT: 67 IN | TEMPERATURE: 96.8 F | RESPIRATION RATE: 15 BRPM | BODY MASS INDEX: 37.67 KG/M2 | HEART RATE: 65 BPM

## 2025-06-06 PROCEDURE — 6370000000 HC RX 637 (ALT 250 FOR IP): Performed by: INTERNAL MEDICINE

## 2025-06-06 PROCEDURE — 99239 HOSP IP/OBS DSCHRG MGMT >30: CPT | Performed by: PSYCHIATRY & NEUROLOGY

## 2025-06-06 PROCEDURE — 6370000000 HC RX 637 (ALT 250 FOR IP): Performed by: PSYCHIATRY & NEUROLOGY

## 2025-06-06 RX ORDER — TRAZODONE HYDROCHLORIDE 50 MG/1
50 TABLET ORAL NIGHTLY PRN
Qty: 30 TABLET | Refills: 0 | Status: SHIPPED | OUTPATIENT
Start: 2025-06-06

## 2025-06-06 RX ORDER — FERROUS SULFATE 325(65) MG
325 TABLET ORAL 2 TIMES DAILY WITH MEALS
Qty: 60 TABLET | Refills: 0 | Status: SHIPPED | OUTPATIENT
Start: 2025-06-06

## 2025-06-06 RX ORDER — HYDROXYZINE HYDROCHLORIDE 50 MG/1
50 TABLET, FILM COATED ORAL 3 TIMES DAILY PRN
Qty: 30 TABLET | Refills: 0 | Status: SHIPPED | OUTPATIENT
Start: 2025-06-06 | End: 2025-06-16

## 2025-06-06 RX ADMIN — CLONIDINE HYDROCHLORIDE 0.1 MG: 0.1 TABLET ORAL at 08:43

## 2025-06-06 RX ADMIN — FENOFIBRATE 54 MG: 54 TABLET ORAL at 11:26

## 2025-06-06 RX ADMIN — CETIRIZINE HYDROCHLORIDE 10 MG: 10 TABLET, FILM COATED ORAL at 08:43

## 2025-06-06 RX ADMIN — DIVALPROEX SODIUM 500 MG: 500 TABLET, EXTENDED RELEASE ORAL at 08:43

## 2025-06-06 RX ADMIN — LISDEXAMFETAMINE DIMESYLATE 40 MG: 20 CAPSULE ORAL at 08:43

## 2025-06-06 RX ADMIN — OLANZAPINE 10 MG: 10 TABLET, FILM COATED ORAL at 08:43

## 2025-06-06 RX ADMIN — PANTOPRAZOLE SODIUM 40 MG: 40 TABLET, DELAYED RELEASE ORAL at 08:42

## 2025-06-06 RX ADMIN — METFORMIN HYDROCHLORIDE 500 MG: 500 TABLET ORAL at 08:43

## 2025-06-06 RX ADMIN — TOPIRAMATE 200 MG: 100 TABLET, FILM COATED ORAL at 08:42

## 2025-06-06 RX ADMIN — ATORVASTATIN CALCIUM 20 MG: 20 TABLET, FILM COATED ORAL at 08:43

## 2025-06-06 RX ADMIN — FERROUS SULFATE TAB 325 MG (65 MG ELEMENTAL FE) 325 MG: 325 (65 FE) TAB at 08:43

## 2025-06-06 NOTE — CARE COORDINATION
Discharge Arrangements Home Care manager Juventino was called [phone]. He reports he will  patient between 12:30-1:00pm today and asked that medications be sent to [facility] [address]  [place]     Guardian notified: n/a    Discharge destination/address: Group [address]   [address] [place] [address]       Transported by:  Juventino home care manager will be picking up patient between 12-30-1:00pm     Patient was not accepting of referral.No noted substance use   Follow up appointment is scheduled for   [facility]   [address]  [facility] [address]   6/11@230pm for MENTAL HEALTH followup; medications sent to [facility] per mom   *FAUSTO resources were offered to patient throughout admission and at time of discharge. This list of [facility] FAUSTO providers was provided to patient:     [facility]  [address] [facility] [address]   [address] [facility] [address]  Phone: [phone]     Phone: [phone]    [facility]   [address] [facility] [address]   [address] [facility] [address]  Phone: [phone]     Phone: [phone]    [facility] [place], [address] [address]    [address] [facility]. [address] [facility] [address]  Phone: [phone]     Phone: [phone]    Health Connections     [facility]   [address] [place], [place] [address]e. [address] [facility] [address]  [place] [address]      Phone: [phone]  Phone: [phone]        [facility]  [address] [facility]. [facility] [address]   [address] [place] [address]  Phone: [phone]     Phone:  [phone]    [facility]      [facility]  [address] [facility] [address]   [address] [facility] [address]  Phone: [phone]     Phone: [phone]    [address] [place] [address] [address] [facility]

## 2025-06-06 NOTE — DISCHARGE INSTRUCTIONS
[phone]     Suicide Hotline ([facility] Crisis Care Line)  (166) 268-3108       To obtain results of pending studies call Medical Records at: [phone]     For emergencies and 24 hour/7 days a week contact information:  [phone]    Substance Use Treatment options provided by Local Help Now - a website/selina sponsored by the Mental Health & Recovery Services Board of [facility]. For more information please visit [email or web address]://[email or web address]-TV.easyOwn.it/    *FAUSTO resources were offered to patient throughout admission and at time of discharge. This list of [facility] FAUSTO providers was provided to patient:     [facility]  [address] [facility] [place] [facility] [address]    [address] [facility] [address]  Phone: [phone]      Phone: [phone]    [facility] [place] [facility]   [address] [facility] [address]    [address] [facility] [place] [facility] [address]  Phone: [phone]      Phone: [phone]    [facility], [facility]  [address] [facility] [address]     [address] [facility] [facility] [address]  Phone: [phone]      Phone: [phone]    [facility]   [address] [facility] [facility] [address]  [address] [facility] [address]  Phone: [phone]      Phone: [phone]    [facility]  [address] [facility]. [facility] [address]    [address] [place] [address]  Phone: [phone]      Phone:  [phone]    New Concepts       [facility]  [address] [facility] [place] [facility] [address]    [address] [facility] [place] [facility] [address]  Phone: [phone]      Phone: [phone]    [facility]  [address] [facility] [address]    [address] [facility] [address]  Phone: [phone]      Phone: [phone]    [facility]   Empowered for [facility]  [address] [facility] [place] [facility] [address]  [address] [facility] [address]  Phone: [phone]      Phone: [phone]    [facility]      [facility]  [address] [facility] [address] [place] [address]    [address] [facility] [address]  Phone: [phone]      Phone: [phone]    [facility]     [facility] Department of Psychiatry  [address] [facility] [address]   [address] [place], [place] [address]  Phone: [phone]      Phone: [phone]    [facility]       [facility]  [address] [facility] [address]     [address] [facility] [address]  Phone: [phone]      Phone: [phone]    [facility]   [address] [facility] [address]    [address] [facility] [address] [facility] [address]  Phone: [phone]      Phone: [phone] Ext: [phone]    [place] [address] [facility] [address] / [address] [facility] [address] / [address] [facility] [address]  Phone: [phone]    [facility]   [facility]  [address] [facility] [address] [facility] [address]  [address] [place] [address]  Phone: [phone]      Phone: [phone]    [facility]  [address] [facility] [address]    [address] [facility] [address]  Phone: [phone]      Phone: [phone]

## 2025-06-06 NOTE — TRANSITION OF CARE
Behavioral Health Transition Record    Patient Name: Elías Cha  YOB: 1990   Medical Record Number: [id number]  Date of Admission: 6/2/2025  4:34 PM   Date of Discharge: 06/06/25    Attending Provider: Darvin Suarez MD   Discharging Provider: Erick  To contact this individual call [phone] and ask the  to page.  If unavailable, ask to be transferred to Behavioral Health Provider on call.  A Behavioral Health Provider will be available on call 24/7 and during holidays.    Primary Care Provider: Maikol Abarca MD    Allergies   Allergen Reactions    Carbinoxamine Maleate Other (See Comments)     Hyperactivity, insomnia also known as Rondec         Reason for Admission: Patient: Elías Cha  MRN: [id number]  Reason for Admission to Psychiatric Unit:  Threat to self requiring 24 hour professional observation  Threat to others requiring 24 hour professional observation  Acute disordered/bizarre behavior or psychomotor agitation or retardation;interferes with ADLs so that patient cannot function at a less intensive care level of care during evaluation and treatment   A mental disorder causing major disability in social, interpersonal, occupational, and/or educational functioning that is leading to dangerous or life-threatening functioning, and that can only be addressed in an acute inpatient setting   A mental disorder that causes an inability to maintain adequate nurtrition or self-care, and family/community support cannot provide reliable, essential care, so that the patient cannont function at a less intensive level of care during evaluation and treatment   Failure of outpatient psychiatry treatment so that the beneficiary requires 24 hour professional observation and care  Concerns about patient's safety in the community  Past Mental Health Diagnosis: a history of  Major Depression and Anxiety Disorder  Triggering event or precipitating factor: Relationship Issues  Length of  an appointment? No  Patient was offered medication to assist with tobacco cessation at discharge? No    Alcohol/Substance Abuse Discharge Plan:   Does the patient have a history of substance/alcohol abuse and requires a referral for treatment? No  Patient referred to the following for substance/alcohol abuse treatment with an appointment? No  Patient was offered medication to assist with substance/alcohol abuse cessation at discharge? No      Patient discharged to: Home; Transition record discussed with patient/caregiver and provided this record in hard copy or electronically

## 2025-06-06 NOTE — PLAN OF CARE
Problem: Self Harm/Suicidality  Goal: Will have no self-injury during hospital stay  Description: INTERVENTIONS:1.  Ensure constant observer at bedside with Q15M safety checks2.  Maintain a safe environment3.  Secure patient belongings4.  Ensure family/visitors adhere to safety recommendations5.  Ensure safety tray has been added to patient's diet order6.  Every shift and PRN: Re-assess suicidal risk via Frequent Screener  Outcome: Progressing  Note: Pt denies thoughts of self harm and is agreeable to seeking out should thoughts of self harm arise. Safe environment maintained. Every 15 minute checks for safety cont per unit policy. Will cont to monitor for safety and provides support and reassurance as needed.      Problem: Pain  Goal: Verbalizes/displays adequate comfort level or baseline comfort level  Outcome: Progressing  Note: Patient denies pain upon assessment. Will continue to monitor.

## 2025-06-06 NOTE — PLAN OF CARE
Problem: Self Harm/Suicidality  Goal: Will have no self-injury during hospital stay  Description: INTERVENTIONS:1.  Ensure constant observer at bedside with Q15M safety checks2.  Maintain a safe environment3.  Secure patient belongings4.  Ensure family/visitors adhere to safety recommendations5.  Ensure safety tray has been added to patient's diet order6.  Every shift and PRN: Re-assess suicidal risk via Frequent Screener  6/6/2025 1043 by Amber Balbuena RN  Outcome: Completed     Problem: Depression  Goal: Will be euthymic at discharge  Description: INTERVENTIONS:1. Administer medication as ordered2. Provide emotional support via 1:1 interaction with staff3. Encourage involvement in milieu/groups/activities4. Monitor for social isolation  Outcome: Completed     Problem: Pain  Goal: Verbalizes/displays adequate comfort level or baseline comfort level  6/6/2025 1043 by Amber Balbuena, RN  Outcome: Completed

## 2025-06-06 NOTE — BH NOTE
Behavioral Health [facility]  Discharge Note    Pt discharged with followings belongings:   Dental Appliances: None  Vision - Corrective Lenses: None  Hearing Aid: None  Jewelry: Bracelet, Necklace  Body Piercings Removed: No  Clothing: Undergarments, Shorts, Shirt, Pajamas  Other Valuables: Money   Valuables sent home with patient. Patient educated on aftercare instructions: given to patient  Information faxed to [place] by staff  at 1:03 PM .Patient verbalize understanding of AVS:  yes, patient discharge home via [facility] .    Status EXAM upon discharge:  Mental Status and Behavioral Exam  Normal: No  Level of Assistance: Independent/Self  Facial Expression: Flat  Affect: Blunt  Level of Consciousness: Alert  Frequency of Checks: 4 times per hour, close  Mood:Normal: No  Mood: Anxious  Motor Activity:Normal: Yes  Eye Contact: Fair  Observed Behavior: Cooperative  Sexual Misconduct History: Current - no  Preception: [place] to person, [place] to time, [place] to place, [place] to situation  Attention:Normal: No  Attention: Distractible  Thought Processes: Unremarkable  Thought Content:Normal: Yes  Thought Content: Preoccupations  Depression Symptoms: Impaired concentration  Anxiety Symptoms: Generalized  Marissa Symptoms: No problems reported or observed.  Hallucinations: None  Delusions: No  Memory:Normal: Yes  Memory: Poor recent  Insight and Judgment: Yes  Insight and Judgment: Poor judgment, Poor insight        Metabolic Screening:    Lab Results   Component Value Date    LABA1C 5.3 06/04/2025       Lab Results   Component Value Date    CHOL 128 06/04/2025    CHOL 183 09/17/2015    CHOL 237 (H) 08/15/2015     Lab Results   Component Value Date    TRIG 130 06/04/2025    TRIG 228 (H) 09/17/2015    TRIG 279 (H) 08/15/2015     Lab Results   Component Value Date    HDL 22 (L) 06/04/2025    HDL 23 (L) 09/13/2018    HDL 18 (L) 09/17/2015    HDL 18 (L) 08/15/2015     No components found for: \"LDLCAL\"  No  components found for: \"LABVLDL\"    Amber Balbuena RN

## 2025-06-07 NOTE — DISCHARGE SUMMARY
After few days of hospital care, patient began to feel improvement.  Depression lifted, thoughts to harm self ceased.  Sleep improved, appetite was good. On morning rounds 6/6/2025, Warrenmelissa ZAMARRIPA Semaj  endorses feeling ready for discharge. Patient denies suicidal or homicidal ideations, denies hallucinations or delusions. Denies SE's from meds.  It was decided that maximum benefit from hospital care had been achieved and patient can be discharged.     Consults:   Internal medicine for medical management    Significant Diagnostic Studies: Routine labs and diagnostics    Treatments: Psychotropic medications, therapy with group, milieu, and 1:1 with nurses, social workers, PAAKIRA/CNP, and Attending physician.      Discharge Medications:  Discharge Medication List as of 6/6/2025 11:05 AM        START taking these medications    Details   ferrous sulfate (IRON 325) 325 (65 Fe) MG tablet Take 1 tablet by mouth 2 times daily (with meals), Disp-60 tablet, R-0Normal      hydrOXYzine HCl (ATARAX) 50 MG tablet Take 1 tablet by mouth 3 times daily as needed for Anxiety, Disp-30 tablet, R-0Normal      traZODone (DESYREL) 50 MG tablet Take 1 tablet by mouth nightly as needed for Sleep, Disp-30 tablet, R-0Normal           CONTINUE these medications which have NOT CHANGED    Details   Lisdexamfetamine Dimesylate (VYVANSE) 40 MG CAPS Take 40 mg by mouth daily. Max Daily Amount: 40 mgHistorical Med      fenofibrate (TRICOR) 48 MG tablet Take 1 tablet by mouth dailyHistorical Med      simvastatin (ZOCOR) 40 MG tablet Take 1 tablet by mouth nightlyHistorical Med      omeprazole (PRILOSEC) 20 MG delayed release capsule Take 1 capsule by mouth dailyHistorical Med      loratadine (CLARITIN) 10 MG tablet Take 1 tablet by mouth dailyHistorical Med      metFORMIN (GLUCOPHAGE) 500 MG tablet Take 1 tablet by mouth in the morning and at bedtime NOT DIABETIC, TAKES TO COUNTERACT ZYPREXA SIDE EFFECTSHistorical Med      cloNIDine (CATAPRES) 0.1 MG  examination, evaluation, counseling and review of medications and discharge plan.

## 2025-06-16 NOTE — H&P
Department of Psychiatry  Attending Physician Psychiatric Assessment   Patient: Elías Cha  MRN: [id number]  Reason for Admission to Psychiatric Unit:  Threat to self requiring 24 hour professional observation  Threat to others requiring 24 hour professional observation  Acute disordered/bizarre behavior or psychomotor agitation or retardation;interferes with ADLs so that patient cannot function at a less intensive care level of care during evaluation and treatment   A mental disorder causing major disability in social, interpersonal, occupational, and/or educational functioning that is leading to dangerous or life-threatening functioning, and that can only be addressed in an acute inpatient setting   A mental disorder that causes an inability to maintain adequate nurtrition or self-care, and family/community support cannot provide reliable, essential care, so that the patient cannont function at a less intensive level of care during evaluation and treatment   Failure of outpatient psychiatry treatment so that the beneficiary requires 24 hour professional observation and care  Concerns about patient's safety in the community  Past Mental Health Diagnosis: a history of  Major Depression and Anxiety Disorder  Triggering event or precipitating factor: Relationship Issues  Length of time/duration of triggering event: Days  Legal Status: Involuntary    CHIEF COMPLAINT: Acute psychosis    History obtained from: Patient, electronic medical record          HISTORY OF PRESENT ILLNESS:    Elías Cha is a 34 y.o. male who has a past medical history of autism, bipolar 2 disorder, hypertension, seizures. Patient presented to the ED with his  due to suicidal ideations.  Has been having a \"psychotic episode\" for 10 days.  Noted to have increased aggression, suicidal, and homicidal ideations.   states the patient attempted to run into traffic and jump off a moving car in order to end his life.   Detail Level: Simple Additional Notes: Patient consent was obtained to proceed with the visit and recommended plan of care after discussion of all risks and benefits, including the risks of COVID-19 exposure. Render Risk Assessment In Note?: no Additional Notes: Pt consent was obtained to proceed with the visit and recommended plan of care after discussion of all risks and benefits, including the risks of COVID 19 exposure